# Patient Record
Sex: FEMALE | Race: WHITE | Employment: OTHER | URBAN - METROPOLITAN AREA
[De-identification: names, ages, dates, MRNs, and addresses within clinical notes are randomized per-mention and may not be internally consistent; named-entity substitution may affect disease eponyms.]

---

## 2022-02-21 ENCOUNTER — OFFICE VISIT (OUTPATIENT)
Dept: ENDOCRINOLOGY CLINIC | Facility: CLINIC | Age: 52
End: 2022-02-21
Payer: COMMERCIAL

## 2022-02-21 VITALS
HEART RATE: 66 BPM | DIASTOLIC BLOOD PRESSURE: 60 MMHG | SYSTOLIC BLOOD PRESSURE: 149 MMHG | WEIGHT: 247 LBS | BODY MASS INDEX: 45.45 KG/M2 | HEIGHT: 62 IN | RESPIRATION RATE: 18 BRPM

## 2022-02-21 DIAGNOSIS — R53.83 FATIGUE, UNSPECIFIED TYPE: Primary | ICD-10-CM

## 2022-02-21 DIAGNOSIS — E03.9 HYPOTHYROIDISM, UNSPECIFIED TYPE: ICD-10-CM

## 2022-02-21 PROCEDURE — 3008F BODY MASS INDEX DOCD: CPT | Performed by: INTERNAL MEDICINE

## 2022-02-21 PROCEDURE — 3078F DIAST BP <80 MM HG: CPT | Performed by: INTERNAL MEDICINE

## 2022-02-21 PROCEDURE — 3077F SYST BP >= 140 MM HG: CPT | Performed by: INTERNAL MEDICINE

## 2022-02-21 PROCEDURE — 99204 OFFICE O/P NEW MOD 45 MIN: CPT | Performed by: INTERNAL MEDICINE

## 2022-02-21 RX ORDER — AMOXICILLIN 875 MG/1
TABLET, COATED ORAL
COMMUNITY
Start: 2022-02-18

## 2022-02-21 RX ORDER — KETOROLAC TROMETHAMINE 5 MG/ML
SOLUTION OPHTHALMIC
COMMUNITY
Start: 2021-09-27

## 2022-02-21 RX ORDER — MONTELUKAST SODIUM 10 MG/1
10 TABLET ORAL DAILY
COMMUNITY
Start: 2021-09-27

## 2022-02-21 RX ORDER — LORATADINE 10 MG/1
10 TABLET ORAL DAILY
COMMUNITY
Start: 2021-09-27

## 2022-02-21 RX ORDER — LEVOTHYROXINE SODIUM 0.03 MG/1
25 TABLET ORAL DAILY
COMMUNITY
Start: 2022-02-13 | End: 2022-03-01

## 2022-02-25 ENCOUNTER — PATIENT MESSAGE (OUTPATIENT)
Dept: ENDOCRINOLOGY CLINIC | Facility: CLINIC | Age: 52
End: 2022-02-25

## 2022-02-25 LAB
ACTH, PLASMA: 19 PG/ML (ref 6–50)
CORTISOL, TOTAL: 10.2 MCG/DL
HEMATOCRIT: 39.4 % (ref 35–45)
HEMOGLOBIN: 13.1 G/DL (ref 11.7–15.5)
MCH: 28.9 PG (ref 27–33)
MCHC: 33.2 G/DL (ref 32–36)
MCV: 86.8 FL (ref 80–100)
MPV: 9.7 FL (ref 7.5–12.5)
PLATELET COUNT: 306 THOUSAND/UL (ref 140–400)
RDW: 13.7 % (ref 11–15)
RED BLOOD CELL COUNT: 4.54 MILLION/UL (ref 3.8–5.1)
T4, FREE: 1 NG/DL (ref 0.8–1.8)
THYROGLOBULIN ANTIBODIES: 1 IU/ML
THYROID PEROXIDASE$ANTIBODIES: 1 IU/ML
TSH: 3.27 MIU/L
VITAMIN B12: 344 PG/ML (ref 200–1100)
VITAMIN D, 25-OH, TOTAL: 40 NG/ML (ref 30–100)
WHITE BLOOD CELL COUNT: 6.4 THOUSAND/UL (ref 3.8–10.8)

## 2022-03-01 RX ORDER — LEVOTHYROXINE SODIUM 0.05 MG/1
50 TABLET ORAL
Qty: 90 TABLET | Refills: 0 | Status: SHIPPED | OUTPATIENT
Start: 2022-03-01 | End: 2022-05-30

## 2022-03-01 NOTE — TELEPHONE ENCOUNTER
Hi!    Please let patient know that I have reviewed all of her labs. 1.) Vitamin D is at a good level, as is hemoglobin and cannot explain her fatigue  2.) Her vitamin B12 is on the low side and I would recommend that she start to take 500mcg daily  3.) Her TSH is still not at goal, and I think she may benefit from increasing levothyroxine dose from 25 to 50mcg. We can then check this in 3 months. 4.) Her cortisol level is not optimal. We can recheck this if after optimizing her TSH, she still does not feel well. Thank you!

## 2022-03-01 NOTE — TELEPHONE ENCOUNTER
From: Jerelene Goodpasture Delisi  To: Mindy Caicedo MD  Sent: 2/25/2022 6:53 AM CST  Subject: Blood work     Good morning. I did my blood work Tues am but haven't seen the results come over. Have you received them yet?

## 2022-03-02 ENCOUNTER — PATIENT MESSAGE (OUTPATIENT)
Dept: ENDOCRINOLOGY CLINIC | Facility: CLINIC | Age: 52
End: 2022-03-02

## 2022-03-02 NOTE — TELEPHONE ENCOUNTER
Replied to patient's [employer] message with result notes from Dr. Seo Patient below. Prescription sent and lab orders have been placed.

## 2022-03-26 ENCOUNTER — TELEPHONE (OUTPATIENT)
Dept: ENDOCRINOLOGY CLINIC | Facility: CLINIC | Age: 52
End: 2022-03-26

## 2022-03-26 RX ORDER — LEVOTHYROXINE SODIUM 0.05 MG/1
50 TABLET ORAL
Qty: 90 TABLET | Refills: 0 | Status: SHIPPED | OUTPATIENT
Start: 2022-03-26 | End: 2022-06-24

## 2022-03-26 NOTE — TELEPHONE ENCOUNTER
Hi!    Labs from 2/22/22:    TSH- 3.27  FT4- 1.0  Anti-TPO Ab- neg  Anti-Tg Ab- neg    ACTH- 19  AM Cortisol- 10.2  Vitamin B12- 344  Vitamin D- 40    Please let the patient know that I think that she would benefit from an increase in levothyroxine dose from 25mcg to 50mcg. She would also beneffit from starting vitamin B12 at 500mcg daily. I would like to have her check her labs in 3 months and see her back in 6 months. Thank you!

## 2022-06-09 LAB
T4, FREE: 1.1 NG/DL (ref 0.8–1.8)
TSH: 2.48 MIU/L
VITAMIN B12: 316 PG/ML (ref 200–1100)

## (undated) DIAGNOSIS — E03.9 HYPOTHYROIDISM, UNSPECIFIED TYPE: Primary | ICD-10-CM

## (undated) DIAGNOSIS — E03.9 HYPOTHYROIDISM, UNSPECIFIED TYPE: ICD-10-CM

## (undated) DIAGNOSIS — E53.8 VITAMIN B12 DEFICIENCY: ICD-10-CM

## (undated) DIAGNOSIS — R53.83 FATIGUE, UNSPECIFIED TYPE: Primary | ICD-10-CM